# Patient Record
Sex: MALE | Race: WHITE | NOT HISPANIC OR LATINO | URBAN - METROPOLITAN AREA
[De-identification: names, ages, dates, MRNs, and addresses within clinical notes are randomized per-mention and may not be internally consistent; named-entity substitution may affect disease eponyms.]

---

## 2023-09-14 ENCOUNTER — OFFICE VISIT (OUTPATIENT)
Dept: URBAN - METROPOLITAN AREA CLINIC 63 | Facility: CLINIC | Age: 58
End: 2023-09-14
Payer: COMMERCIAL

## 2023-09-14 VITALS
TEMPERATURE: 97.8 F | DIASTOLIC BLOOD PRESSURE: 80 MMHG | HEIGHT: 69 IN | SYSTOLIC BLOOD PRESSURE: 130 MMHG | BODY MASS INDEX: 31.96 KG/M2 | HEART RATE: 65 BPM | WEIGHT: 215.8 LBS | OXYGEN SATURATION: 99 %

## 2023-09-14 DIAGNOSIS — R74.8 ABNORMAL LIVER ENZYMES: ICD-10-CM

## 2023-09-14 DIAGNOSIS — K76.0 FATTY LIVER: ICD-10-CM

## 2023-09-14 DIAGNOSIS — Z86.010 HISTORY OF COLON POLYPS: ICD-10-CM

## 2023-09-14 PROBLEM — 197321007: Status: ACTIVE | Noted: 2023-09-14

## 2023-09-14 PROCEDURE — 99203 OFFICE O/P NEW LOW 30 MIN: CPT | Performed by: INTERNAL MEDICINE

## 2023-09-14 RX ORDER — MONTELUKAST 10 MG/1
1 TABLET TABLET, FILM COATED ORAL ONCE A DAY
Status: ACTIVE | COMMUNITY

## 2023-09-14 RX ORDER — AMLODIPINE BESYLATE 10 MG/1
1 TABLET TABLET ORAL ONCE A DAY
Status: ACTIVE | COMMUNITY

## 2023-09-14 NOTE — HPI-TODAY'S VISIT:
Jono is a 58-year-old male that presents today as a new patient.  Referred for elevated liver enzymes.  Prior labs, ultrasound, and FibroScan reviewed.  Ultrasound and FibroScan consistent with fatty liver. He does report drinking alcohol on a daily basis with a few drinks per night.  He has no GI complaints at this time.  He reports having a colonoscopy in 2021.  Repeat colonoscopy was recommended in 5 years due to history of colon polyps. Labs 07/2023:Bilirubin 0.4, alkaline phosphatase 56, AST 44, ALT 71. Labs 06/2022:WBC 8.4, hemoglobin 14.9, MCV 89, platelet 235, creatinine 0.95, sodium 138, potassium 4.2, albumin 4.7, bilirubin 0.4 alkaline phosphatase 65, AST 38, ALT 64.  Acute hepatitis panel 12/2022 negative including negative hepatitis B surface antigen and hepatitis C antibody. Ultrasound 12/21/2022:Moderate to severe hepatic steatosis.  Mild hepatomegaly.  Common bile duct measures 4 mm.  Gallbladder appears normal without gallstones. FibroScan 12/29/2022:Severe steatosis (S3) with CAP score 332.  No fibrosis (F0) with K PA 5.2.

## 2023-09-27 PROBLEM — 428283002: Status: ACTIVE | Noted: 2023-09-27

## 2023-12-21 ENCOUNTER — OFFICE VISIT (OUTPATIENT)
Dept: URBAN - METROPOLITAN AREA CLINIC 63 | Facility: CLINIC | Age: 58
End: 2023-12-21
Payer: COMMERCIAL

## 2023-12-21 ENCOUNTER — LAB OUTSIDE AN ENCOUNTER (OUTPATIENT)
Dept: URBAN - METROPOLITAN AREA CLINIC 63 | Facility: CLINIC | Age: 58
End: 2023-12-21

## 2023-12-21 VITALS
HEART RATE: 77 BPM | OXYGEN SATURATION: 99 % | SYSTOLIC BLOOD PRESSURE: 134 MMHG | DIASTOLIC BLOOD PRESSURE: 76 MMHG | TEMPERATURE: 97.3 F | WEIGHT: 222 LBS | HEIGHT: 69 IN | BODY MASS INDEX: 32.88 KG/M2

## 2023-12-21 DIAGNOSIS — R19.7 DIARRHEA, UNSPECIFIED TYPE: ICD-10-CM

## 2023-12-21 DIAGNOSIS — K76.0 FATTY LIVER: ICD-10-CM

## 2023-12-21 DIAGNOSIS — Z86.010 HISTORY OF COLON POLYPS: ICD-10-CM

## 2023-12-21 DIAGNOSIS — R74.8 ABNORMAL LIVER ENZYMES: ICD-10-CM

## 2023-12-21 PROCEDURE — 99214 OFFICE O/P EST MOD 30 MIN: CPT | Performed by: INTERNAL MEDICINE

## 2023-12-21 RX ORDER — MONTELUKAST 10 MG/1
1 TABLET TABLET, FILM COATED ORAL ONCE A DAY
Status: ACTIVE | COMMUNITY

## 2023-12-21 RX ORDER — AMLODIPINE BESYLATE 10 MG/1
1 TABLET TABLET ORAL ONCE A DAY
Status: ACTIVE | COMMUNITY

## 2023-12-21 RX ORDER — DIPHENOXYLATE HYDROCHLORIDE AND ATROPINE SULFATE 2.5; .025 MG/1; MG/1
1 TABLET TABLET ORAL
Status: ACTIVE | COMMUNITY

## 2023-12-21 RX ORDER — DOXYCYCLINE 40 MG/1
1 TABLET CAPSULE ORAL ONCE A DAY
Status: ACTIVE | COMMUNITY

## 2023-12-21 RX ORDER — DIPHENOXYLATE HYDROCHLORIDE AND ATROPINE SULFATE 2.5; .025 MG/1; MG/1
1 TABLET AS NEEDED TABLET ORAL THREE TIMES A DAY
Qty: 270 TABLET | Refills: 1 | OUTPATIENT
Start: 2023-12-21 | End: 2024-06-18

## 2023-12-21 NOTE — HPI-TODAY'S VISIT:
Jono is presents today for follow-up. GI history significant for fatty liver, abnormal liver enzymes, and colon polyps. He reports intermittent episodes of diarrhea. He has been on Lomotil as needed for several years. He usually only takes this once daily. However, on further questioning, he does report taking magnesium supplements which could also be contributing to his diarrhea. He is trying to lose weight. He continues to drink alcohol. He has tried to make changes in his diet. Most recent labs are from 11/2023. Labs revealed elevated transaminases. These have been persistently elevated. Prior imaging has included ultrasound and FibroScan. Ultrasound revealed moderate to severe hepatic steatosis and mild hepatomegaly. FibroScan revealed severe steatosis without fibrosis. He reports having a colonoscopy in 2021 He does have a history of colon polyps. He says that he was told he should repeat his colonoscopy in 3 years. Labs 11/2023:Albumin 4.4, creatinine 1.0, bilirubin 0.5, alkaline phosphatase 55, AST 44, ALT 81. Labs 07/2023:Albumin 4.5, creatinine 1.05, bilirubin 0.4, alkaline phosphatase 86, AST 44, ALT 71.

## 2024-01-07 LAB
ACTIN (SMOOTH MUSCLE) ANTIBODY (IGG): <20
ALBUMIN/GLOBULIN RATIO: 1.7
ALBUMIN: 4.5
ALKALINE PHOSPHATASE: 46
ALT (SGPT): 74
ANA SCREEN, IFA: POSITIVE
AST (SGOT): 43
BILIRUBIN, DIRECT: 0.1
BILIRUBIN, INDIRECT: 0.4
BILIRUBIN, TOTAL: 0.5
GLOBULIN: 2.6
IMMUNOGLOBULIN G, QN, SERUM: 1143
PROTEIN, TOTAL: 7.1

## 2024-01-16 ENCOUNTER — TELEPHONE ENCOUNTER (OUTPATIENT)
Dept: URBAN - METROPOLITAN AREA CLINIC 23 | Facility: CLINIC | Age: 59
End: 2024-01-16

## 2024-02-07 ENCOUNTER — COLON (OUTPATIENT)
Dept: URBAN - METROPOLITAN AREA SURGERY CENTER 4 | Facility: SURGERY CENTER | Age: 59
End: 2024-02-07

## 2024-02-21 ENCOUNTER — COLON (OUTPATIENT)
Dept: URBAN - METROPOLITAN AREA SURGERY CENTER 4 | Facility: SURGERY CENTER | Age: 59
End: 2024-02-21
Payer: COMMERCIAL

## 2024-02-21 ENCOUNTER — LAB (OUTPATIENT)
Dept: URBAN - METROPOLITAN AREA CLINIC 4 | Facility: CLINIC | Age: 59
End: 2024-02-21
Payer: COMMERCIAL

## 2024-02-21 DIAGNOSIS — K63.89 OTHER SPECIFIED DISEASES OF INTESTINE: ICD-10-CM

## 2024-02-21 DIAGNOSIS — K63.5 POLYP OF SIGMOID COLON, UNSPECIFIED TYPE: ICD-10-CM

## 2024-02-21 DIAGNOSIS — K64.0 FIRST DEGREE HEMORRHOIDS: ICD-10-CM

## 2024-02-21 DIAGNOSIS — Z86.010 PERSONAL HISTORY OF COLONIC POLYPS: ICD-10-CM

## 2024-02-21 PROCEDURE — 45380 COLONOSCOPY AND BIOPSY: CPT | Performed by: INTERNAL MEDICINE

## 2024-02-21 PROCEDURE — 88305 TISSUE EXAM BY PATHOLOGIST: CPT | Performed by: PATHOLOGY

## 2024-02-21 RX ORDER — DIPHENOXYLATE HYDROCHLORIDE AND ATROPINE SULFATE 2.5; .025 MG/1; MG/1
1 TABLET TABLET ORAL
Status: ACTIVE | COMMUNITY

## 2024-02-21 RX ORDER — AMLODIPINE BESYLATE 10 MG/1
1 TABLET TABLET ORAL ONCE A DAY
Status: ACTIVE | COMMUNITY

## 2024-02-21 RX ORDER — DOXYCYCLINE 40 MG/1
1 TABLET CAPSULE ORAL ONCE A DAY
Status: ACTIVE | COMMUNITY

## 2024-02-21 RX ORDER — MONTELUKAST 10 MG/1
1 TABLET TABLET, FILM COATED ORAL ONCE A DAY
Status: ACTIVE | COMMUNITY

## 2024-02-21 RX ORDER — DIPHENOXYLATE HYDROCHLORIDE AND ATROPINE SULFATE 2.5; .025 MG/1; MG/1
1 TABLET AS NEEDED TABLET ORAL THREE TIMES A DAY
Qty: 270 TABLET | Refills: 1 | Status: ACTIVE | COMMUNITY
Start: 2023-12-21 | End: 2024-06-18

## 2024-02-29 ENCOUNTER — OV EP (OUTPATIENT)
Dept: URBAN - METROPOLITAN AREA CLINIC 63 | Facility: CLINIC | Age: 59
End: 2024-02-29

## 2024-03-07 ENCOUNTER — OV EP (OUTPATIENT)
Dept: URBAN - METROPOLITAN AREA CLINIC 63 | Facility: CLINIC | Age: 59
End: 2024-03-07
Payer: COMMERCIAL

## 2024-03-07 VITALS
TEMPERATURE: 97.5 F | HEART RATE: 60 BPM | DIASTOLIC BLOOD PRESSURE: 90 MMHG | HEIGHT: 69 IN | SYSTOLIC BLOOD PRESSURE: 130 MMHG | WEIGHT: 217.6 LBS | OXYGEN SATURATION: 98 % | BODY MASS INDEX: 32.23 KG/M2

## 2024-03-07 DIAGNOSIS — K76.0 FATTY LIVER: ICD-10-CM

## 2024-03-07 DIAGNOSIS — Z86.010 HISTORY OF COLON POLYPS: ICD-10-CM

## 2024-03-07 DIAGNOSIS — R74.8 ABNORMAL LIVER ENZYMES: ICD-10-CM

## 2024-03-07 PROCEDURE — 99213 OFFICE O/P EST LOW 20 MIN: CPT | Performed by: INTERNAL MEDICINE

## 2024-03-07 RX ORDER — MONTELUKAST 10 MG/1
1 TABLET TABLET, FILM COATED ORAL ONCE A DAY
Status: ACTIVE | COMMUNITY

## 2024-03-07 RX ORDER — DOXYCYCLINE 40 MG/1
1 TABLET CAPSULE ORAL ONCE A DAY
Status: ACTIVE | COMMUNITY

## 2024-03-07 RX ORDER — AMLODIPINE BESYLATE 10 MG/1
1 TABLET TABLET ORAL ONCE A DAY
Status: ACTIVE | COMMUNITY

## 2024-03-07 RX ORDER — DIPHENOXYLATE HYDROCHLORIDE AND ATROPINE SULFATE 2.5; .025 MG/1; MG/1
1 TABLET AS NEEDED TABLET ORAL THREE TIMES A DAY
Qty: 270 TABLET | Refills: 1 | Status: ACTIVE | COMMUNITY
Start: 2023-12-21 | End: 2024-06-18

## 2024-03-07 NOTE — HPI-TODAY'S VISIT:
Jono is a 58-year-old male that presents today for follow-up.  Initially referred for elevated liver enzymes.  He has had persistently elevated transaminases.  He had labs from 01/2024.  Bilirubin and alkaline phosphatase were normal, but transaminases were elevated with AST 43 and ALT 74.  Hepatitis panel negative.  Smooth muscle antibody and IgG normal but CORETTA was positive with 1: 80 titer.  Prior ultrasound has revealed hepatic steatosis and mild hepatomegaly.  FibroScan revealed severe steatosis and no fibrosis. He just had repeat fibroscan with Dr Best which revealed  and kPa 9.1. Labs 03/05/2024 with Dr Best revealed ALT 89 and AST 68 with normal bilirubin and alkaline phosphatase. He continues to drink alcohol on a fairly regular basis. However, he says that he is trying to make diet and lifestyle changes.  He underwent colonoscopy 02/2024.  Colonoscopy revealed sigmoid polyp measuring 3 mm and small nonbleeding internal hemorrhoids.  Pathology was consistent with prominent mucosal . aggregate.  He does have history of colon polyps on prior colonoscopy.  Due to this, repeat colonoscopy recommended in 5 years.

## 2024-05-21 ENCOUNTER — DASHBOARD ENCOUNTERS (OUTPATIENT)
Age: 59
End: 2024-05-21

## 2024-05-24 ENCOUNTER — OFFICE VISIT (OUTPATIENT)
Dept: URBAN - METROPOLITAN AREA CLINIC 57 | Facility: CLINIC | Age: 59
End: 2024-05-24

## 2024-05-24 RX ORDER — DOXYCYCLINE 40 MG/1
1 TABLET CAPSULE ORAL ONCE A DAY
COMMUNITY

## 2024-05-24 RX ORDER — AMLODIPINE BESYLATE 10 MG/1
1 TABLET TABLET ORAL ONCE A DAY
COMMUNITY

## 2024-05-24 RX ORDER — DIPHENOXYLATE HYDROCHLORIDE AND ATROPINE SULFATE 2.5; .025 MG/1; MG/1
1 TABLET AS NEEDED TABLET ORAL THREE TIMES A DAY
Qty: 270 TABLET | Refills: 1 | COMMUNITY
Start: 2023-12-21 | End: 2024-06-18

## 2024-05-24 RX ORDER — MONTELUKAST 10 MG/1
1 TABLET TABLET, FILM COATED ORAL ONCE A DAY
COMMUNITY

## 2024-06-06 ENCOUNTER — OFFICE VISIT (OUTPATIENT)
Dept: URBAN - METROPOLITAN AREA CLINIC 63 | Facility: CLINIC | Age: 59
End: 2024-06-06